# Patient Record
Sex: MALE | Race: WHITE | NOT HISPANIC OR LATINO | URBAN - METROPOLITAN AREA
[De-identification: names, ages, dates, MRNs, and addresses within clinical notes are randomized per-mention and may not be internally consistent; named-entity substitution may affect disease eponyms.]

---

## 2024-01-16 ENCOUNTER — APPOINTMENT (RX ONLY)
Dept: URBAN - METROPOLITAN AREA CLINIC 75 | Facility: CLINIC | Age: 84
Setting detail: DERMATOLOGY
End: 2024-01-16

## 2024-01-16 VITALS — HEIGHT: 70 IN | WEIGHT: 166 LBS

## 2024-01-16 DIAGNOSIS — I78.8 OTHER DISEASES OF CAPILLARIES: ICD-10-CM

## 2024-01-16 DIAGNOSIS — L71.1 RHINOPHYMA: ICD-10-CM

## 2024-01-16 PROCEDURE — 99212 OFFICE O/P EST SF 10 MIN: CPT

## 2024-01-16 PROCEDURE — ? INVENTORY

## 2024-01-16 PROCEDURE — ? COUNSELING

## 2024-01-16 PROCEDURE — ? VBEAM PRIMA LASER

## 2024-01-16 ASSESSMENT — LOCATION DETAILED DESCRIPTION DERM: LOCATION DETAILED: NASAL DORSUM

## 2024-01-16 ASSESSMENT — LOCATION ZONE DERM: LOCATION ZONE: NOSE

## 2024-01-16 ASSESSMENT — LOCATION SIMPLE DESCRIPTION DERM: LOCATION SIMPLE: NOSE

## 2024-01-16 NOTE — PROCEDURE: VBEAM PRIMA LASER
Wavelength: 595 nm
Spot Size: 15 mm
Comments: #, nm, J, ms, mm, cnt 2024-01-16,1064,20.00,20.00,11.0,1 2024-01-16,1064,16.00,20.00,11.0,18 2024-01-16, 595,5.25,20.00,11.0,21 2024-01-16, 595,6.00,3.00,11.0,46 2024-01-16, 595,5.50,3.00,11.0,1 2024-01-16, 595,15.00,20.00,3x10,5 2024-01-16, 595,12.50,3.00,3x10,8
Post-Care Instructions: I reviewed with the patient in detail post-care instructions. \\nPatient should wear sun protection until treated areas are fully healed
Spot Size: 14 mm
Cryogen Time (Ms): 30
Delay Time (Ms): 20
Spot Size: 7 mm
Post-Procedure Care: Ice applied. \\nSunscreen recommended
Detail Level: Zone
Consent: Written consent obtained, risks reviewed including but not limited to crusting, scabbing, blistering, scarring, darker or lighter pigmentary change, incidental hair removal, bruising, and/or incomplete removal.

## 2024-02-16 ENCOUNTER — APPOINTMENT (RX ONLY)
Dept: URBAN - METROPOLITAN AREA CLINIC 75 | Facility: CLINIC | Age: 84
Setting detail: DERMATOLOGY
End: 2024-02-16

## 2024-02-16 DIAGNOSIS — I78.8 OTHER DISEASES OF CAPILLARIES: ICD-10-CM

## 2024-02-16 PROCEDURE — ? VBEAM PRIMA LASER

## 2024-02-16 PROCEDURE — ? ADDITIONAL NOTES

## 2024-02-16 PROCEDURE — ? INVENTORY

## 2024-02-16 ASSESSMENT — LOCATION SIMPLE DESCRIPTION DERM: LOCATION SIMPLE: NOSE

## 2024-02-16 ASSESSMENT — LOCATION DETAILED DESCRIPTION DERM: LOCATION DETAILED: NASAL DORSUM

## 2024-02-16 ASSESSMENT — LOCATION ZONE DERM: LOCATION ZONE: NOSE

## 2024-02-16 NOTE — PROCEDURE: VBEAM PRIMA LASER
Wavelength: 595 nm
Spot Size: 15 mm
Comments: #, nm, J, ms, mm, cnt\\[id number], 595,12.50,1.50,3x10,41
Post-Care Instructions: I reviewed with the patient in detail post-care instructions. \\nPatient should wear sun protection until treated areas are fully healed
Spot Size: 14 mm
Cryogen Time (Ms): 30
Delay Time (Ms): 20
Spot Size: 7 mm
Post-Procedure Care: Ice applied. \\nSunscreen recommended
Detail Level: Zone
Consent: Written consent obtained, risks reviewed including but not limited to crusting, scabbing, blistering, scarring, darker or lighter pigmentary change, incidental hair removal, bruising, and/or incomplete removal.

## 2024-02-16 NOTE — PROCEDURE: ADDITIONAL NOTES
Additional Notes: $300 for laser treatment today per Dr. oT.
Render Risk Assessment In Note?: no
Detail Level: Zone